# Patient Record
Sex: FEMALE | Race: WHITE | Employment: PART TIME | URBAN - NONMETROPOLITAN AREA
[De-identification: names, ages, dates, MRNs, and addresses within clinical notes are randomized per-mention and may not be internally consistent; named-entity substitution may affect disease eponyms.]

---

## 2020-03-26 ENCOUNTER — PRENATAL OFFICE VISIT (OUTPATIENT)
Dept: OBGYN | Facility: OTHER | Age: 30
End: 2020-03-26
Attending: OBSTETRICS & GYNECOLOGY
Payer: COMMERCIAL

## 2020-03-26 VITALS
BODY MASS INDEX: 25.03 KG/M2 | RESPIRATION RATE: 18 BRPM | SYSTOLIC BLOOD PRESSURE: 120 MMHG | DIASTOLIC BLOOD PRESSURE: 74 MMHG | OXYGEN SATURATION: 99 % | HEART RATE: 88 BPM | WEIGHT: 189.7 LBS

## 2020-03-26 DIAGNOSIS — Z34.92 NORMAL PREGNANCY IN SECOND TRIMESTER: Primary | ICD-10-CM

## 2020-03-26 LAB
ERYTHROCYTE [DISTWIDTH] IN BLOOD BY AUTOMATED COUNT: 13.2 % (ref 10–15)
GLUCOSE 1H P 50 G GLC PO SERPL-MCNC: 87 MG/DL (ref 60–129)
HCT VFR BLD AUTO: 36.6 % (ref 35–47)
HGB BLD-MCNC: 12 G/DL (ref 11.7–15.7)
MCH RBC QN AUTO: 31.1 PG (ref 26.5–33)
MCHC RBC AUTO-ENTMCNC: 32.8 G/DL (ref 31.5–36.5)
MCV RBC AUTO: 95 FL (ref 78–100)
PLATELET # BLD AUTO: 255 10E9/L (ref 150–450)
RBC # BLD AUTO: 3.86 10E12/L (ref 3.8–5.2)
WBC # BLD AUTO: 8.7 10E9/L (ref 4–11)

## 2020-03-26 PROCEDURE — 90471 IMMUNIZATION ADMIN: CPT | Performed by: OBSTETRICS & GYNECOLOGY

## 2020-03-26 PROCEDURE — 90715 TDAP VACCINE 7 YRS/> IM: CPT | Performed by: OBSTETRICS & GYNECOLOGY

## 2020-03-26 PROCEDURE — 86780 TREPONEMA PALLIDUM: CPT | Mod: ZL | Performed by: OBSTETRICS & GYNECOLOGY

## 2020-03-26 PROCEDURE — 85027 COMPLETE CBC AUTOMATED: CPT | Mod: ZL | Performed by: OBSTETRICS & GYNECOLOGY

## 2020-03-26 PROCEDURE — 82950 GLUCOSE TEST: CPT | Mod: ZL | Performed by: OBSTETRICS & GYNECOLOGY

## 2020-03-26 PROCEDURE — 36415 COLL VENOUS BLD VENIPUNCTURE: CPT | Mod: ZL | Performed by: OBSTETRICS & GYNECOLOGY

## 2020-03-26 PROCEDURE — 99213 OFFICE O/P EST LOW 20 MIN: CPT | Performed by: OBSTETRICS & GYNECOLOGY

## 2020-03-26 RX ORDER — PRENATAL VIT/IRON FUM/FOLIC AC 27MG-0.8MG
1 TABLET ORAL DAILY
COMMUNITY

## 2020-03-26 ASSESSMENT — PAIN SCALES - GENERAL: PAINLEVEL: NO PAIN (0)

## 2020-03-26 NOTE — PROGRESS NOTES
CC: Recheck OB visit at 26 weeks gestaton  HPI: Annabelle Martell presents for a routine OB visit now at 26 weeks  She has no concerns. Denies cramping, bleeding, normal fetal movement. She is visiting from [place], [place], and staying with her mother for the time being with the current travel restrictions in place due to COVID-19. She has an uncomplicated pregnancy to date.    OB History    Para Term  AB Living   1 0 0 0 0 0   SAB TAB Ectopic Multiple Live Births   0 0 0 0 0      # Outcome Date GA Lbr [place]/2nd Weight Sex Delivery Anes PTL Lv   1 Current              Current Outpatient Medications   Medication     Prenatal Vit-Fe Fumarate-FA (PRENATAL MULTIVITAMIN W/IRON) 27-0.8 MG tablet     No current facility-administered medications for this visit.          O: /74 (BP Location: Right arm, Patient Position: Sitting, Cuff Size: Adult Regular)   Pulse 88   Resp 18   Wt 86 kg (189 lb 11.2 oz)   SpO2 99%   Breastfeeding No   BMI 25.03 kg/m    Body mass index is 25.03 kg/m .  See OB flow sheet  EXAM:  NAD  FH:26 cm  FHT:150 bpm    No results found for any visits on 20.    A/P:  (Z34.92) Normal pregnancy in second trimester  Comment:   Plan: Glucose tolerance, gest screen, 1 hour, GH IMM-        TDAP VACCINE (BOOSTRIX ), Treponema Ab w Reflex        to RPR and Titer, CBC W PLT No Diff              Recheck in 4-6 weeks      Problem List:     Low risk    Gerson Ramos MD FACOG  10:43 AM 3/26/2020

## 2020-03-27 LAB — T PALLIDUM AB SER QL: NONREACTIVE
